# Patient Record
Sex: FEMALE | Race: WHITE
[De-identification: names, ages, dates, MRNs, and addresses within clinical notes are randomized per-mention and may not be internally consistent; named-entity substitution may affect disease eponyms.]

---

## 2018-12-10 ENCOUNTER — HOSPITAL ENCOUNTER (OUTPATIENT)
Dept: HOSPITAL 88 - ENDO | Age: 58
Discharge: HOME | End: 2018-12-10
Attending: INTERNAL MEDICINE
Payer: COMMERCIAL

## 2018-12-10 VITALS — SYSTOLIC BLOOD PRESSURE: 118 MMHG | DIASTOLIC BLOOD PRESSURE: 69 MMHG

## 2018-12-10 DIAGNOSIS — K64.8: ICD-10-CM

## 2018-12-10 DIAGNOSIS — Z86.010: ICD-10-CM

## 2018-12-10 DIAGNOSIS — Z12.11: Primary | ICD-10-CM

## 2018-12-10 PROCEDURE — 45378 DIAGNOSTIC COLONOSCOPY: CPT

## 2018-12-10 PROCEDURE — 93005 ELECTROCARDIOGRAM TRACING: CPT

## 2018-12-10 NOTE — OPERATIVE REPORT
DATE OF PROCEDURE:  December 10, 2018 



REFERRING PHYSICIAN:  Dr. Anthony Shaver



PROCEDURE PERFORMED:  Colonoscopy.



INDICATIONS FOR COLONOSCOPY:  Surveillance colonoscopy and personal history 

of colon polyps.



MEDICATION:  Patient was done under MAC.  Please see anesthesiologist's 

note.



PROCEDURE:  With the patient in the left lateral decubitus position, the 

flexible fiberoptic Olympus colonoscope was inserted into the rectum with 

ease and advanced all the way to the cecum.  The scope was then withdrawn 

slowly.  Mucosa overlying the cecum, ascending colon, transverse colon, 

descending colon, sigmoid colon, and rectum grossly appeared to be within 

normal limits.  The scope was then retroflexed into the distal rectum and 

small internal hemorrhoids were noted, none of which was actively bleeding. 

 The scope was then straightened out.  It was subsequently withdrawn.  

Patient tolerated the procedure well.



IMPRESSION:  Internal hemorrhoids, none actively bleeding.





PLAN:  Initiate high-fiber and low-fat diet.  Initiate high-fiber 

supplement.  Patient might benefit from a followup colonoscopy in 5 years.  











DD:  12/10/2018 13:28

DT:  12/10/2018 13:36

Job#:  [id number]



cc:LEW SHAVER DO